# Patient Record
Sex: MALE | Race: WHITE | NOT HISPANIC OR LATINO | Employment: OTHER | URBAN - METROPOLITAN AREA
[De-identification: names, ages, dates, MRNs, and addresses within clinical notes are randomized per-mention and may not be internally consistent; named-entity substitution may affect disease eponyms.]

---

## 2022-12-13 ENCOUNTER — HOSPITAL ENCOUNTER (EMERGENCY)
Facility: HOSPITAL | Age: 52
Discharge: HOME | End: 2022-12-13
Attending: EMERGENCY MEDICINE

## 2022-12-13 VITALS
TEMPERATURE: 98.1 F | WEIGHT: 210.54 LBS | OXYGEN SATURATION: 98 % | BODY MASS INDEX: 30.14 KG/M2 | RESPIRATION RATE: 16 BRPM | SYSTOLIC BLOOD PRESSURE: 153 MMHG | DIASTOLIC BLOOD PRESSURE: 87 MMHG | HEIGHT: 70 IN | HEART RATE: 88 BPM

## 2022-12-13 DIAGNOSIS — S61.411A LACERATION OF RIGHT HAND: Primary | ICD-10-CM

## 2022-12-13 RX ORDER — LIDOCAINE HYDROCHLORIDE 10 MG/ML
10 INJECTION, SOLUTION EPIDURAL; INFILTRATION; INTRACAUDAL; PERINEURAL ONCE
Status: COMPLETED | OUTPATIENT
Start: 2022-12-13 | End: 2022-12-13

## 2022-12-13 RX ADMIN — LIDOCAINE HYDROCHLORIDE 10 ML: 10 INJECTION, SOLUTION EPIDURAL; INFILTRATION; INTRACAUDAL; PERINEURAL at 04:12

## 2022-12-13 NOTE — DISCHARGE INSTRUCTIONS
Return to PCP/Urgent Care/ED for suture removal in 7-10 days    Let soap and water run over it gently- do not scrub    Return to ED for signs of infection: purulent drainage (pus), fever, chills, increased redness, increased pain, red streaking up the arm

## 2022-12-13 NOTE — ED PROVIDER NOTES
History  Chief Complaint   Patient presents with   • Hand Laceration     Patient reports he cut right hand pointer finger knuckle with glass today  Hand wrapped PTA  Reports tetanus is utd      47year old male presenting for evaluation of hand laceration  Patient states he was walking and cut his hand on a sharp piece of glass  His laceration is located just proximal to the 2nd digit or the dorsal aspect of the R hand  He is R hand dominant  Bleeding controlled with pressure dressing pta  He states this happened approximately 5 hours ago, he was in the waiting room of another ED pta  He has FROM at the R hand and 2nd digit  No NTW to the hand or digit  Patient states he has had his tetanus shot updated within the past couple of years  History provided by:  Patient   used: No    Laceration  Location:  Hand  Hand laceration location:  Dorsum of R hand  Depth:  Cutaneous  Quality: straight    Bleeding: controlled    Time since incident:  5 hours  Laceration mechanism:  Broken glass  Pain details:     Quality:  Aching    Severity:  Mild    Timing:  Constant    Progression:  Partially resolved  Foreign body present:  No foreign bodies  Relieved by:  Pressure  Worsened by:  Nothing  Tetanus status:  Up to date  Associated symptoms: no fever, no focal weakness, no numbness, no rash, no redness, no swelling and no streaking        None       History reviewed  No pertinent past medical history  History reviewed  No pertinent surgical history  History reviewed  No pertinent family history  I have reviewed and agree with the history as documented  E-Cigarette/Vaping     E-Cigarette/Vaping Substances     Social History     Tobacco Use   • Smoking status: Never   • Smokeless tobacco: Never   Substance Use Topics   • Alcohol use: Not Currently   • Drug use: Not Currently       Review of Systems   Constitutional: Negative for fever  Gastrointestinal: Negative for nausea and vomiting  Musculoskeletal: Negative for arthralgias and joint swelling  Skin: Positive for wound  Negative for rash  Neurological: Negative for focal weakness, weakness and light-headedness  All other systems reviewed and are negative  Physical Exam  Physical Exam  Vitals reviewed  Constitutional:       General: He is not in acute distress  Appearance: Normal appearance  He is well-developed and well-groomed  He is not ill-appearing  HENT:      Head: Normocephalic and atraumatic  Right Ear: External ear normal       Left Ear: External ear normal       Nose: Nose normal    Eyes:      General: No scleral icterus  Conjunctiva/sclera: Conjunctivae normal    Cardiovascular:      Rate and Rhythm: Normal rate and regular rhythm  Pulmonary:      Effort: Pulmonary effort is normal       Breath sounds: No stridor  Abdominal:      General: There is no distension  Musculoskeletal:         General: No deformity  Normal range of motion  Right hand: Laceration present  No swelling, tenderness or bony tenderness  Normal range of motion  Normal strength  Normal capillary refill  Normal pulse  Left hand: Normal         Hands:       Cervical back: Normal range of motion  Comments: 1 5 cm laceration to the dorsal aspect of the R hand proximal to the MCP joint of the 2nd digit  FROM at the 2nd digit  FDS and FDP normal against resistance  Strength and sensation intact  Capillary refill <2  Skin:     Coloration: Skin is not jaundiced or pale  Findings: No lesion or rash  Neurological:      Mental Status: He is alert and oriented to person, place, and time  Psychiatric:         Mood and Affect: Mood normal          Behavior: Behavior normal  Behavior is cooperative           Vital Signs  ED Triage Vitals [12/13/22 0355]   Temperature Pulse Respirations Blood Pressure SpO2   98 1 °F (36 7 °C) 88 16 153/87 98 %      Temp Source Heart Rate Source Patient Position - Orthostatic VS BP Location FiO2 (%)   Oral Monitor Sitting Right arm --      Pain Score       6           Vitals:    12/13/22 0355   BP: 153/87   Pulse: 88   Patient Position - Orthostatic VS: Sitting         Visual Acuity      ED Medications  Medications   lidocaine (PF) (XYLOCAINE-MPF) 1 % injection 10 mL (10 mL Infiltration Given by Other 12/13/22 4071)       Diagnostic Studies  Results Reviewed     None                 No orders to display              Procedures  Laceration repair    Date/Time: 12/13/2022 4:36 AM  Performed by: Sarita Marina PA-C  Authorized by: Sarita Marina PA-C   Consent: Verbal consent obtained  Risks and benefits: risks, benefits and alternatives were discussed  Consent given by: patient  Patient understanding: patient states understanding of the procedure being performed  Patient identity confirmed: arm band  Body area: upper extremity  Location details: right hand  Laceration length: 1 5 cm  Foreign bodies: no foreign bodies  Tendon involvement: none  Nerve involvement: none  Vascular damage: no  Anesthesia: local infiltration    Anesthesia:  Local Anesthetic: lidocaine 1% with epinephrine  Anesthetic total: 5 mL    Sedation:  Patient sedated: no        Procedure Details:  Irrigation solution: saline  Irrigation method: syringe  Amount of cleaning: extensive  Debridement: minimal  Degree of undermining: minimal  Skin closure: Ethilon (4-0)  Number of sutures: 6  Technique: running  Approximation: close  Approximation difficulty: simple  Dressing: 4x4 sterile gauze and gauze packing  Patient tolerance: patient tolerated the procedure well with no immediate complications               ED Course                               SBIRT 20yo+    Flowsheet Row Most Recent Value   SBIRT (25 yo +)    In order to provide better care to our patients, we are screening all of our patients for alcohol and drug use  Would it be okay to ask you these screening questions?  Unable to answer at this time Filed at: 12/13/2022 0405                    [facility]  Number of Diagnoses or Management Options  Laceration of right hand: new and requires workup  Diagnosis management comments:     Patient presenting for evaluation of a laceration to the right dorsum of the hand just proximal to the second MCP joint  Full range of motion at the second digit, strength and sensation intact fully  Lacerations approximately 1 5 cm  Laceration was repaired with 6 running 4-0 Ethilon sutures  No immediate complications after procedure  Reviewed proper care of stitches and when to return to the emergency department for possible complications  Patient may return to the emergency department/PCP/UC for removal in 7 to 10 days  Prior to discharge, discharge instructions were discussed with patient at bedside  Patient was provided both verbal and written instructions  Patient is understanding of the discharge instructions and is agreeable to plan of care  Return precautions were discussed with patient bedside, patient verbalized understanding of signs and symptoms that would necessitate return to the ED  All questions were answered  Patient was comfortable with the plan of care and discharged to home  Dispo: discharge home with follow up to PCP  Patient stable, in no acute distress and non-toxic at discharge         Amount and/or Complexity of Data Reviewed  Tests in the medicine section of CPT®: ordered and reviewed  Review and summarize past medical records: yes  Independent visualization of images, tracings, or specimens: yes    Risk of Complications, Morbidity, and/or Mortality  Presenting problems: low  Management options: low    Patient Progress  Patient progress: resolved      Disposition  Final diagnoses:   Laceration of right hand     Time reflects when diagnosis was documented in both MDM as applicable and the Disposition within this note     Time User Action Codes Description Comment    12/13/2022  4:37 AM Annabelle Damian [[id number]] Laceration of right hand       ED Disposition     ED Disposition   Discharge    Condition   Stable    Date/Time   Tue Dec 13, 2022  4:37 AM    Comment   Amaya Garner discharge to home/self care  Follow-up Information     Follow up With Specialties Details Why Contact Info Additional [address] [facility] Emergency Department Emergency Medicine In 1 week For suture removal [place] [phone]  [address] [facility] Emergency Department, [address] [place] [address], [id number]          There are no discharge medications for this patient  No discharge procedures on file      PDMP Review     None          ED Provider  Electronically Signed by [facility], [facility]  12/13/22 5833

## 2023-08-30 ENCOUNTER — HOSPITAL ENCOUNTER (EMERGENCY)
Facility: HOSPITAL | Age: 53
Discharge: HOME | End: 2023-08-30
Attending: EMERGENCY MEDICINE
Payer: COMMERCIAL

## 2023-08-30 ENCOUNTER — APPOINTMENT (EMERGENCY)
Dept: CT IMAGING | Facility: HOSPITAL | Age: 53
End: 2023-08-30
Payer: COMMERCIAL

## 2023-08-30 VITALS
RESPIRATION RATE: 18 BRPM | HEART RATE: 83 BPM | SYSTOLIC BLOOD PRESSURE: 131 MMHG | OXYGEN SATURATION: 96 % | TEMPERATURE: 98.1 F | DIASTOLIC BLOOD PRESSURE: 88 MMHG

## 2023-08-30 DIAGNOSIS — R51.9 HEAD PAIN: Primary | ICD-10-CM

## 2023-08-30 PROCEDURE — 70450 CT HEAD/BRAIN W/O DYE: CPT

## 2023-08-30 PROCEDURE — 99284 EMERGENCY DEPT VISIT MOD MDM: CPT | Performed by: EMERGENCY MEDICINE

## 2023-08-30 PROCEDURE — G1004 CDSM NDSC: HCPCS

## 2023-08-30 PROCEDURE — 99283 EMERGENCY DEPT VISIT LOW MDM: CPT

## 2023-08-30 RX ORDER — ACETAMINOPHEN 325 MG/1
975 TABLET ORAL ONCE
Status: COMPLETED | OUTPATIENT
Start: 2023-08-30 | End: 2023-08-30

## 2023-08-30 RX ORDER — METOPROLOL SUCCINATE 50 MG/1
50 TABLET, EXTENDED RELEASE ORAL DAILY
COMMUNITY

## 2023-08-30 RX ORDER — EPINEPHRINE 0.15 MG/.3ML
0.15 INJECTION INTRAMUSCULAR ONCE
COMMUNITY

## 2023-08-30 RX ORDER — AMILORIDE HYDROCHLORIDE 5 MG/1
5 TABLET ORAL DAILY
COMMUNITY

## 2023-08-30 RX ORDER — CLOZAPINE 100 MG/1
100 TABLET ORAL DAILY
COMMUNITY

## 2023-08-30 RX ORDER — METOCLOPRAMIDE 10 MG/1
10 TABLET ORAL ONCE
Status: COMPLETED | OUTPATIENT
Start: 2023-08-30 | End: 2023-08-30

## 2023-08-30 RX ADMIN — METOCLOPRAMIDE 10 MG: 10 TABLET ORAL at 13:05

## 2023-08-30 RX ADMIN — ACETAMINOPHEN 975 MG: 325 TABLET ORAL at 13:04

## 2023-08-30 NOTE — DISCHARGE INSTRUCTIONS
Tylenol and/or Ibuprofen as needed for pain    Follow up with family doctor    Return to the ER if you have any new/worsening symptoms including but not limited to vision changes, numbness, weakness, balance issues, speech difficulty, etc.

## 2023-08-30 NOTE — ED PROVIDER NOTES
History  Chief Complaint   Patient presents with   • Medical Problem     Reports intermittent shocks to head. Sx started last night. Denies headache, dizziness, blurred vision. 48 yo M h/o CKD, HTN, hyperuricemia, renal cysts, schizophrenia; presenting for evaluation of head pain. Pt states sx started last night with pain to L parietal region/scalp. Reports intermittent shocks of pain every few minutes, only lasting a few seconds and no pain in between. No head injury/trauma. No history of HAs or similar sx. Denies visual changes, numbness, weakness, gait abnormality/balance issues, neck pain/stiffness, fevers, chills, CP, SOB, cough, abdominal pain, N/V/D/C  Pt reports he is worried about a brain tumor because father had a GBM               Per independent review of external records:  - 7/14/23 nephrology- increased metoprolol to 50 QD      Prior to Admission Medications   Prescriptions Last Dose Informant Patient Reported? Taking? AMILoride 5 mg tablet 8/29/2023  Yes Yes   Sig: Take 5 mg by mouth daily   EPINEPHrine (EPIPEN JR) 0.15 mg/0.3 mL SOAJ   Yes Yes   Sig: Inject 0.15 mg into a muscle once   cloZAPine (CLOZARIL) 100 mg tablet 8/30/2023  Yes Yes   Sig: Take 100 mg by mouth daily   metoprolol succinate (TOPROL-XL) 50 mg 24 hr tablet 8/29/2023  Yes Yes   Sig: Take 50 mg by mouth daily      Facility-Administered Medications: None       History reviewed. No pertinent past medical history. History reviewed. No pertinent surgical history. History reviewed. No pertinent family history. I have reviewed and agree with the history as documented. E-Cigarette/Vaping     E-Cigarette/Vaping Substances     Social History     Tobacco Use   • Smoking status: Never   • Smokeless tobacco: Never   Substance Use Topics   • Alcohol use: Not Currently   • Drug use: Not Currently       Review of Systems    Physical Exam  Physical Exam  Vitals and nursing note reviewed.    Constitutional:       Appearance: He is well-developed. HENT:      Head: Normocephalic and atraumatic. Comments: No skin changes/rash/lesion, no scalp ttp, no swelling     Nose: Nose normal.   Eyes:      General: No scleral icterus. Right eye: No discharge. Left eye: No discharge. Extraocular Movements: Extraocular movements intact. Conjunctiva/sclera: Conjunctivae normal.      Pupils: Pupils are equal, round, and reactive to light. Comments: Pupils 1-2 mm b/l and reactive to light, EOMI, no papilledema b/l   Neck:      Comments: Full neck ROM, no meningeal signs, no neck ttp, no swelling/ttp  Cardiovascular:      Rate and Rhythm: Normal rate and regular rhythm. Heart sounds: Normal heart sounds. Pulmonary:      Effort: Pulmonary effort is normal. No respiratory distress. Breath sounds: Normal breath sounds. No stridor. No wheezing or rales. Chest:      Chest wall: No tenderness. Abdominal:      General: There is no distension. Palpations: Abdomen is soft. Tenderness: There is no abdominal tenderness. There is no guarding or rebound. Musculoskeletal:         General: No deformity. Cervical back: Normal range of motion and neck supple. No rigidity or tenderness. Lymphadenopathy:      Cervical: No cervical adenopathy. Skin:     General: Skin is warm and dry. Findings: No rash. Neurological:      General: No focal deficit present. Mental Status: He is alert and oriented to person, place, and time. Cranial Nerves: No cranial nerve deficit. Sensory: No sensory deficit. Motor: No weakness or abnormal muscle tone. Coordination: Coordination normal.      Gait: Gait normal.      Deep Tendon Reflexes: Reflexes normal.      Comments: CN 2-12 intact, strength 5/5 throughout, sensation grossly intact, normal coordination/gait   Psychiatric:         Thought Content:  Thought content normal.         Judgment: Judgment normal.         Vital Signs  ED Triage Vitals Temperature Pulse Respirations Blood Pressure SpO2   08/30/23 1201 08/30/23 1201 08/30/23 1201 08/30/23 1201 08/30/23 1201   98.1 °F (36.7 °C) 86 18 142/82 99 %      Temp src Heart Rate Source Patient Position - Orthostatic VS BP Location FiO2 (%)   -- 08/30/23 1209 08/30/23 1209 08/30/23 1209 --    Monitor Lying Right arm       Pain Score       08/30/23 1304       9           Vitals:    08/30/23 1201 08/30/23 1209   BP: 142/82 131/88   Pulse: 86 83   Patient Position - Orthostatic VS:  Lying         Visual Acuity  Visual Acuity    Flowsheet Row Most Recent Value   L Pupil Size (mm) 3   R Pupil Size (mm) 3          ED Medications  Medications   metoclopramide (REGLAN) tablet 10 mg (10 mg Oral Given 8/30/23 1305)   acetaminophen (TYLENOL) tablet 975 mg (975 mg Oral Given 8/30/23 1304)       Diagnostic Studies  Results Reviewed     None                 CT head without contrast   ED Interpretation by Krista Cuevas DO (08/30 1429)   IMPRESSION:     No acute intracranial abnormality. Final Result by PROSPER Anderson MD (08/30 1424)      No acute intracranial abnormality. Workstation performed: [id number]                    Procedures  Procedures         ED Course  ED Course as of 08/30/23 1759   Wed Aug 30, 2023   [facility] independently interpreted by myself; no acute abn, pending radiologist read   [phone] Discussed results with pt/pt's mother and plan for outpt f/u                                             Medical Decision Making  46 yo M presenting with L sided head pain- will treat sx, CTH to r/o intracranial bleed/space occupying lesion  PCP/neuro f/u if persistent sx    Pt did have some relief in the ER. CTH read as normal  Discussed outpt f/u and return precautions with pt/mother    Head pain: acute illness or injury  Amount and/or Complexity of Data Reviewed  External Data Reviewed: radiology. Radiology: ordered and independent interpretation performed.  Decision-making details documented in ED Course. Risk  OTC drugs. Prescription drug management. Disposition  Final diagnoses:   Head pain     Time reflects when diagnosis was documented in both MDM as applicable and the Disposition within this note     Time User Action Codes Description Comment    8/30/2023  2:30 PM Karla General Add [R51.9] Head pain       ED Disposition     ED Disposition   Discharge    Condition   Stable    Date/Time   Wed Aug 30, 2023  2:30 PM    [address] discharge to home/self care. Follow-up Information     Follow up With Specialties Details Why Contact Info Additional Information        Family doctor at [facility]     Neurology [facility] Neurology  If persistent pain [address] [facility]  [phone] Neurology [facility], [address] [place] [address], [address] [place] [address], [address] [facility]          Discharge Medication List as of 8/30/2023  2:32 PM      CONTINUE these medications which have NOT CHANGED    Details   AMILoride 5 mg tablet Take 5 mg by mouth daily, Historical Med      cloZAPine (CLOZARIL) 100 mg tablet Take 100 mg by mouth daily, Historical Med      EPINEPHrine (EPIPEN JR) 0.15 mg/0.3 mL SOAJ Inject 0.15 mg into a muscle once, Historical Med      metoprolol succinate (TOPROL-XL) 50 mg 24 hr tablet Take 50 mg by mouth daily, Historical Med             No discharge procedures on file.     PDMP Review     None          ED Provider  Electronically Signed by           Rowdy Benton DO  08/30/23 0247